# Patient Record
Sex: FEMALE | Race: WHITE | NOT HISPANIC OR LATINO | Employment: PART TIME | URBAN - METROPOLITAN AREA
[De-identification: names, ages, dates, MRNs, and addresses within clinical notes are randomized per-mention and may not be internally consistent; named-entity substitution may affect disease eponyms.]

---

## 2017-01-16 DIAGNOSIS — Z12.31 ENCOUNTER FOR SCREENING MAMMOGRAM FOR MALIGNANT NEOPLASM OF BREAST: ICD-10-CM

## 2017-01-16 DIAGNOSIS — Z13.820 ENCOUNTER FOR SCREENING FOR OSTEOPOROSIS: ICD-10-CM

## 2017-01-17 ENCOUNTER — LAB REQUISITION (OUTPATIENT)
Dept: LAB | Facility: HOSPITAL | Age: 62
End: 2017-01-17
Payer: COMMERCIAL

## 2017-01-17 ENCOUNTER — ALLSCRIPTS OFFICE VISIT (OUTPATIENT)
Dept: OTHER | Facility: OTHER | Age: 62
End: 2017-01-17

## 2017-01-17 DIAGNOSIS — Z01.419 ENCOUNTER FOR GYNECOLOGICAL EXAMINATION WITHOUT ABNORMAL FINDING: ICD-10-CM

## 2017-01-17 PROCEDURE — G0145 SCR C/V CYTO,THINLAYER,RESCR: HCPCS | Performed by: OBSTETRICS & GYNECOLOGY

## 2017-01-23 LAB
LAB AP GYN PRIMARY INTERPRETATION: NORMAL
Lab: NORMAL

## 2017-04-27 ENCOUNTER — GENERIC CONVERSION - ENCOUNTER (OUTPATIENT)
Dept: OTHER | Facility: OTHER | Age: 62
End: 2017-04-27

## 2017-05-09 ENCOUNTER — ALLSCRIPTS OFFICE VISIT (OUTPATIENT)
Dept: OTHER | Facility: OTHER | Age: 62
End: 2017-05-09

## 2017-06-07 ENCOUNTER — ALLSCRIPTS OFFICE VISIT (OUTPATIENT)
Dept: OTHER | Facility: OTHER | Age: 62
End: 2017-06-07

## 2017-06-07 PROCEDURE — 88305 TISSUE EXAM BY PATHOLOGIST: CPT | Performed by: OBSTETRICS & GYNECOLOGY

## 2017-06-08 ENCOUNTER — LAB REQUISITION (OUTPATIENT)
Dept: LAB | Facility: HOSPITAL | Age: 62
End: 2017-06-08
Payer: COMMERCIAL

## 2017-06-08 DIAGNOSIS — N95.0 POSTMENOPAUSAL BLEEDING: ICD-10-CM

## 2017-07-21 ENCOUNTER — ALLSCRIPTS OFFICE VISIT (OUTPATIENT)
Dept: OTHER | Facility: OTHER | Age: 62
End: 2017-07-21

## 2017-07-21 PROCEDURE — 88305 TISSUE EXAM BY PATHOLOGIST: CPT | Performed by: OBSTETRICS & GYNECOLOGY

## 2017-07-24 ENCOUNTER — LAB REQUISITION (OUTPATIENT)
Dept: LAB | Facility: HOSPITAL | Age: 62
End: 2017-07-24
Payer: COMMERCIAL

## 2017-07-24 DIAGNOSIS — N95.0 POSTMENOPAUSAL BLEEDING: ICD-10-CM

## 2017-07-24 DIAGNOSIS — N84.0 POLYP OF CORPUS UTERI: ICD-10-CM

## 2017-08-11 DIAGNOSIS — Z78.0 ASYMPTOMATIC MENOPAUSAL STATE: ICD-10-CM

## 2017-08-15 ENCOUNTER — ALLSCRIPTS OFFICE VISIT (OUTPATIENT)
Dept: OTHER | Facility: OTHER | Age: 62
End: 2017-08-15

## 2017-08-18 ENCOUNTER — GENERIC CONVERSION - ENCOUNTER (OUTPATIENT)
Dept: OTHER | Facility: OTHER | Age: 62
End: 2017-08-18

## 2018-01-13 VITALS
HEIGHT: 62 IN | BODY MASS INDEX: 23.19 KG/M2 | DIASTOLIC BLOOD PRESSURE: 62 MMHG | WEIGHT: 126 LBS | SYSTOLIC BLOOD PRESSURE: 116 MMHG

## 2018-01-13 VITALS — HEART RATE: 91 BPM | DIASTOLIC BLOOD PRESSURE: 81 MMHG | SYSTOLIC BLOOD PRESSURE: 121 MMHG

## 2018-01-14 VITALS — BODY MASS INDEX: 22.96 KG/M2 | WEIGHT: 123.5 LBS | SYSTOLIC BLOOD PRESSURE: 108 MMHG | DIASTOLIC BLOOD PRESSURE: 68 MMHG

## 2018-01-15 VITALS — SYSTOLIC BLOOD PRESSURE: 114 MMHG | BODY MASS INDEX: 22.86 KG/M2 | DIASTOLIC BLOOD PRESSURE: 66 MMHG | WEIGHT: 123 LBS

## 2020-10-15 ENCOUNTER — OFFICE VISIT (OUTPATIENT)
Dept: GYNECOLOGY | Facility: CLINIC | Age: 65
End: 2020-10-15
Payer: MEDICARE

## 2020-10-15 VITALS
TEMPERATURE: 97.5 F | HEIGHT: 62 IN | BODY MASS INDEX: 24.33 KG/M2 | SYSTOLIC BLOOD PRESSURE: 100 MMHG | DIASTOLIC BLOOD PRESSURE: 60 MMHG | HEART RATE: 90 BPM | WEIGHT: 132.2 LBS

## 2020-10-15 DIAGNOSIS — Z01.419 ENCOUNTER FOR GYNECOLOGICAL EXAMINATION WITHOUT ABNORMAL FINDING: Primary | ICD-10-CM

## 2020-10-15 DIAGNOSIS — Z12.4 ENCOUNTER FOR PAPANICOLAOU SMEAR FOR CERVICAL CANCER SCREENING: ICD-10-CM

## 2020-10-15 DIAGNOSIS — N95.2 ATROPHIC VAGINITIS: ICD-10-CM

## 2020-10-15 PROCEDURE — G0145 SCR C/V CYTO,THINLAYER,RESCR: HCPCS | Performed by: OBSTETRICS & GYNECOLOGY

## 2020-10-15 PROCEDURE — G0101 CA SCREEN;PELVIC/BREAST EXAM: HCPCS | Performed by: OBSTETRICS & GYNECOLOGY

## 2020-10-15 RX ORDER — ESTRADIOL 0.1 MG/G
1 CREAM VAGINAL WEEKLY
Qty: 42.5 G | Refills: 3 | Status: SHIPPED | OUTPATIENT
Start: 2020-10-15 | End: 2020-10-15

## 2020-10-15 RX ORDER — DIPHENOXYLATE HYDROCHLORIDE AND ATROPINE SULFATE 2.5; .025 MG/1; MG/1
1 TABLET ORAL DAILY
COMMUNITY

## 2020-10-15 RX ORDER — ESTRADIOL 0.1 MG/G
CREAM VAGINAL
Qty: 42.5 G | Refills: 3 | Status: SHIPPED | OUTPATIENT
Start: 2020-10-15

## 2020-10-15 RX ORDER — SIMVASTATIN 5 MG
5 TABLET ORAL
COMMUNITY

## 2020-10-22 LAB
LAB AP GYN PRIMARY INTERPRETATION: NORMAL
Lab: NORMAL

## 2021-03-10 DIAGNOSIS — Z23 ENCOUNTER FOR IMMUNIZATION: ICD-10-CM

## 2022-10-18 ENCOUNTER — ANNUAL EXAM (OUTPATIENT)
Dept: GYNECOLOGY | Facility: CLINIC | Age: 67
End: 2022-10-18
Payer: MEDICARE

## 2022-10-18 VITALS
BODY MASS INDEX: 24.13 KG/M2 | DIASTOLIC BLOOD PRESSURE: 48 MMHG | HEIGHT: 61 IN | SYSTOLIC BLOOD PRESSURE: 98 MMHG | WEIGHT: 127.8 LBS

## 2022-10-18 DIAGNOSIS — Z01.419 ENCOUNTER FOR GYNECOLOGICAL EXAMINATION WITHOUT ABNORMAL FINDING: Primary | ICD-10-CM

## 2022-10-18 DIAGNOSIS — N95.2 ATROPHIC VAGINITIS: ICD-10-CM

## 2022-10-18 DIAGNOSIS — Z12.4 PAP SMEAR FOR CERVICAL CANCER SCREENING: ICD-10-CM

## 2022-10-18 PROCEDURE — G0145 SCR C/V CYTO,THINLAYER,RESCR: HCPCS | Performed by: OBSTETRICS & GYNECOLOGY

## 2022-10-18 PROCEDURE — G0101 CA SCREEN;PELVIC/BREAST EXAM: HCPCS | Performed by: OBSTETRICS & GYNECOLOGY

## 2022-10-18 RX ORDER — ESTRADIOL 0.1 MG/G
CREAM VAGINAL
Qty: 42.5 G | Refills: 3 | Status: SHIPPED | OUTPATIENT
Start: 2022-10-18

## 2022-10-18 NOTE — PROGRESS NOTES
Assessment/Plan:         Diagnoses and all orders for this visit:    Encounter for gynecological examination without abnormal finding    Atrophic vaginitis  -     estradiol (ESTRACE) 0 1 mg/g vaginal cream; 1 gram vaginally M,W,F x 3weeks then once weekly        Subjective:      Patient ID: Ora Mendes is a 79 y o  female  HPI patient presents for annual examination  She offers no complaints other than occasional vaginal itching  She denies any vaginal discharge or bleeding  She is not sexually active at this time  Denies any dysuria, hematuria urgency urinary incontinence  No GI complaints  Colonoscopy 2019: Normal patient does have a history of ulcerative colitis  DEXA scan May of 2022: Normal    The following portions of the patient's history were reviewed and updated as appropriate:   She  has no past medical history on file  She There are no problems to display for this patient  She  has a past surgical history that includes TONSILECTOMY AND ADNOIDECTOMY; Jakin tooth extraction; Dilation and curettage of uterus; and Hysteroscopy w/ polypectomy  Her family history includes Breast cancer in her sister; Melanoma in her brother and mother  She  reports that she has never smoked  She has never used smokeless tobacco  She reports current alcohol use  She reports that she does not use drugs  Current Outpatient Medications   Medication Sig Dispense Refill   • CALCIUM PO Take by mouth     • Carboxymethylcellulose Sodium (REFRESH TEARS OP) Apply to eye     • estradiol (ESTRACE) 0 1 mg/g vaginal cream 1 gram vaginally M,W,F x 3weeks then once weekly 42 5 g 3   • FIBER PO Take by mouth     • multivitamin (THERAGRAN) TABS Take 1 tablet by mouth daily     • Omega-3 Fatty Acids (FISH OIL PO) Take by mouth     • simvastatin (ZOCOR) 5 MG tablet Take 5 mg by mouth daily at bedtime     • Sodium Fluoride (PREVIDENT DT) Apply to teeth       No current facility-administered medications for this visit  Current Outpatient Medications on File Prior to Visit   Medication Sig   • CALCIUM PO Take by mouth   • Carboxymethylcellulose Sodium (REFRESH TEARS OP) Apply to eye   • FIBER PO Take by mouth   • multivitamin (THERAGRAN) TABS Take 1 tablet by mouth daily   • Omega-3 Fatty Acids (FISH OIL PO) Take by mouth   • simvastatin (ZOCOR) 5 MG tablet Take 5 mg by mouth daily at bedtime   • Sodium Fluoride (PREVIDENT DT) Apply to teeth   • [DISCONTINUED] estradiol (ESTRACE) 0 1 mg/g vaginal cream 1 gram vaginally M,W,F x 3weeks then once weekly (Patient not taking: Reported on 10/18/2022)     No current facility-administered medications on file prior to visit  She is allergic to bee venom, pollen extract, and sulfa antibiotics       Review of Systems   Constitutional: Negative  HENT: Negative for sore throat and trouble swallowing  Gastrointestinal: Negative  Genitourinary: Negative  Objective:      BP (!) 98/48   Ht 5' 1" (1 549 m)   Wt 58 kg (127 lb 12 8 oz)   BMI 24 15 kg/m²          Physical Exam  Vitals reviewed  Constitutional:       Appearance: Normal appearance  Cardiovascular:      Rate and Rhythm: Normal rate and regular rhythm  Pulses: Normal pulses  Heart sounds: Normal heart sounds  No murmur heard  Pulmonary:      Effort: Pulmonary effort is normal  No respiratory distress  Breath sounds: Normal breath sounds  Chest:   Breasts:      Right: No swelling, bleeding, inverted nipple, mass, nipple discharge, skin change, tenderness, axillary adenopathy or supraclavicular adenopathy  Left: No swelling, bleeding, inverted nipple, mass, nipple discharge, skin change, tenderness, axillary adenopathy or supraclavicular adenopathy  Abdominal:      General: There is no distension  Palpations: Abdomen is soft  There is no mass  Tenderness: There is no abdominal tenderness  There is no guarding or rebound  Hernia: No hernia is present   There is no hernia in the left inguinal area or right inguinal area  Genitourinary:     General: Normal vulva  Labia:         Right: No rash, tenderness or lesion  Left: No rash, tenderness or lesion  Vagina: Erythema (atrophic) present  Cervix: Normal       Uterus: Normal        Adnexa:         Right: No mass, tenderness or fullness  Left: No mass, tenderness or fullness  Musculoskeletal:      Cervical back: Normal range of motion and neck supple  No tenderness  Lymphadenopathy:      Cervical: No cervical adenopathy  Upper Body:      Right upper body: No supraclavicular, axillary or pectoral adenopathy  Left upper body: No supraclavicular, axillary or pectoral adenopathy  Lower Body: No right inguinal adenopathy  No left inguinal adenopathy  Neurological:      Mental Status: She is alert

## 2022-10-25 LAB
LAB AP GYN PRIMARY INTERPRETATION: NORMAL
Lab: NORMAL

## 2024-11-01 ENCOUNTER — ANNUAL EXAM (OUTPATIENT)
Dept: GYNECOLOGY | Facility: CLINIC | Age: 69
End: 2024-11-01
Payer: MEDICARE

## 2024-11-01 VITALS
BODY MASS INDEX: 25.57 KG/M2 | HEART RATE: 74 BPM | WEIGHT: 135.4 LBS | SYSTOLIC BLOOD PRESSURE: 98 MMHG | HEIGHT: 61 IN | DIASTOLIC BLOOD PRESSURE: 54 MMHG

## 2024-11-01 DIAGNOSIS — Z01.419 ENCOUNTER FOR GYNECOLOGICAL EXAMINATION WITHOUT ABNORMAL FINDING: Primary | ICD-10-CM

## 2024-11-01 DIAGNOSIS — Z13.820 ENCOUNTER FOR SCREENING FOR OSTEOPOROSIS: ICD-10-CM

## 2024-11-01 DIAGNOSIS — N95.2 ATROPHIC VAGINITIS: ICD-10-CM

## 2024-11-01 DIAGNOSIS — Z12.31 ENCOUNTER FOR SCREENING MAMMOGRAM FOR MALIGNANT NEOPLASM OF BREAST: ICD-10-CM

## 2024-11-01 DIAGNOSIS — Z78.0 MENOPAUSE: ICD-10-CM

## 2024-11-01 PROCEDURE — G0145 SCR C/V CYTO,THINLAYER,RESCR: HCPCS | Performed by: OBSTETRICS & GYNECOLOGY

## 2024-11-01 PROCEDURE — G0101 CA SCREEN;PELVIC/BREAST EXAM: HCPCS | Performed by: OBSTETRICS & GYNECOLOGY

## 2024-11-01 RX ORDER — ESTRADIOL 0.1 MG/G
CREAM VAGINAL
Qty: 42.5 G | Refills: 3 | Status: SHIPPED | OUTPATIENT
Start: 2024-11-01

## 2024-11-01 NOTE — PROGRESS NOTES
"Ambulatory Visit  Name: Allie Alcantara      : 1955      MRN: [id number]  Encounter Provider: Arnold Capellan DO  Encounter Date: 2024   Encounter department: [facility] [place] [facility]    Assessment & Plan  Encounter for screening for osteoporosis    Orders:    DXA bone density spine hip and pelvis; Future    Menopause    Orders:    DXA bone density spine hip and pelvis; Future    Encounter for screening mammogram for malignant neoplasm of breast    Orders:    Mammo screening bilateral w 3d and cad; Future    Encounter for gynecological examination without abnormal finding         Atrophic vaginitis    Orders:    estradiol (ESTRACE) 0.1 mg/g vaginal cream; 1 gram vaginally M,W,F x 3weeks then once weekly      History of Present Illness     Allie Alcantara is a 69 y.o. female who presents examination.  She has some issues with vaginal itching and dryness.  Denies any vaginal discharge or bleeding.  Denies any dysuria, hematuria or stress urinary incontinence.  She has some urgency with rare episodes of urgency incontinence.  No GI complaints.    Colonoscopy .  She repeats this every 3 years due to her history of colitis.    DEXA scan May 2022.  Normal      Review of Systems   Constitutional: Negative.    HENT:  Negative for sore throat and trouble swallowing.    Gastrointestinal: Negative.    Genitourinary: Negative.            Objective     BP 98/54   Pulse 74   Ht 5' 1\" (1.549 m)   Wt 61.4 kg (135 lb 6.4 oz)   BMI 25.58 kg/m²     Physical Exam  Vitals reviewed.   Cardiovascular:      Rate and Rhythm: Normal rate and regular rhythm.      Pulses: Normal pulses.      Heart sounds: Normal heart sounds.   Pulmonary:      Effort: Pulmonary effort is normal.      Breath sounds: Normal breath sounds.   Chest:   Breasts:     Right: No swelling, bleeding, inverted nipple, mass, nipple discharge, skin change or tenderness.      Left: No swelling, bleeding, inverted nipple, mass, " nipple discharge, skin change or tenderness.   Abdominal:      General: There is no distension.      Palpations: Abdomen is soft. There is no mass.      Tenderness: There is no abdominal tenderness. There is no guarding or rebound.      Hernia: No hernia is present. There is no hernia in the left inguinal area or right inguinal area.   Genitourinary:     General: Normal vulva.      Labia:         Right: No rash, tenderness or lesion.         Left: No rash, tenderness or lesion.       Comments: Uterus normal size and contour freely mobile and nontender.  No palpable adnexal masses or tenderness.  Vagina with atrophic changes.  Bartholin's and Oliver's glands normal.  No cystocele or rectocele.  Cervix appears normal.  Musculoskeletal:      Cervical back: Normal range of motion and neck supple. No tenderness.   Lymphadenopathy:      Cervical: No cervical adenopathy.      Upper Body:      Right upper body: No supraclavicular, axillary or pectoral adenopathy.      Left upper body: No supraclavicular, axillary or pectoral adenopathy.      Lower Body: No right inguinal adenopathy. No left inguinal adenopathy.   Neurological:      Mental Status: She is alert.

## 2024-11-08 LAB
LAB AP GYN PRIMARY INTERPRETATION: NORMAL
Lab: NORMAL

## 2025-03-07 ENCOUNTER — TELEPHONE (OUTPATIENT)
Age: 70
End: 2025-03-07

## 2025-03-07 NOTE — TELEPHONE ENCOUNTER
Patient calling to inquire about script for estradiol cream. It was ordered in November but patient was waiting for new insurance to start in the beginning of the year before filling it. She is going to call her insurance to check coverage and call back once it is confirmed to have it sent to her pharmacy.